# Patient Record
Sex: FEMALE | Race: WHITE | NOT HISPANIC OR LATINO | Employment: FULL TIME | URBAN - NONMETROPOLITAN AREA
[De-identification: names, ages, dates, MRNs, and addresses within clinical notes are randomized per-mention and may not be internally consistent; named-entity substitution may affect disease eponyms.]

---

## 2018-02-28 ENCOUNTER — DOCUMENTATION ONLY (OUTPATIENT)
Dept: FAMILY MEDICINE | Facility: OTHER | Age: 58
End: 2018-02-28

## 2018-12-10 ENCOUNTER — HOSPITAL ENCOUNTER (OUTPATIENT)
Dept: MAMMOGRAPHY | Facility: OTHER | Age: 58
Discharge: HOME | End: 2018-12-10
Attending: NURSE PRACTITIONER | Admitting: FAMILY MEDICINE
Payer: COMMERCIAL

## 2018-12-10 DIAGNOSIS — Z12.31 VISIT FOR SCREENING MAMMOGRAM: ICD-10-CM

## 2018-12-10 PROCEDURE — 77067 SCR MAMMO BI INCL CAD: CPT

## 2019-12-02 ENCOUNTER — OFFICE VISIT (OUTPATIENT)
Dept: PULMONOLOGY | Facility: OTHER | Age: 59
End: 2019-12-02
Attending: INTERNAL MEDICINE
Payer: COMMERCIAL

## 2019-12-02 VITALS
OXYGEN SATURATION: 98 % | SYSTOLIC BLOOD PRESSURE: 128 MMHG | HEIGHT: 65 IN | WEIGHT: 249.2 LBS | BODY MASS INDEX: 41.52 KG/M2 | DIASTOLIC BLOOD PRESSURE: 76 MMHG | TEMPERATURE: 97.9 F | HEART RATE: 60 BPM | RESPIRATION RATE: 16 BRPM

## 2019-12-02 DIAGNOSIS — G47.50 PARASOMNIA, ORGANIC: Primary | ICD-10-CM

## 2019-12-02 DIAGNOSIS — R06.83 SNORING: ICD-10-CM

## 2019-12-02 DIAGNOSIS — G47.10 HYPERSOMNIA: ICD-10-CM

## 2019-12-02 PROCEDURE — G0463 HOSPITAL OUTPT CLINIC VISIT: HCPCS

## 2019-12-02 RX ORDER — ONDANSETRON 4 MG/1
4 TABLET, FILM COATED ORAL
COMMUNITY

## 2019-12-02 RX ORDER — LEVOTHYROXINE SODIUM 112 UG/1
112 TABLET ORAL
COMMUNITY

## 2019-12-02 SDOH — HEALTH STABILITY: MENTAL HEALTH: HOW OFTEN DO YOU HAVE A DRINK CONTAINING ALCOHOL?: MONTHLY OR LESS

## 2019-12-02 ASSESSMENT — ANXIETY QUESTIONNAIRES
2. NOT BEING ABLE TO STOP OR CONTROL WORRYING: NOT AT ALL
5. BEING SO RESTLESS THAT IT IS HARD TO SIT STILL: NOT AT ALL
1. FEELING NERVOUS, ANXIOUS, OR ON EDGE: NOT AT ALL
3. WORRYING TOO MUCH ABOUT DIFFERENT THINGS: NOT AT ALL
GAD7 TOTAL SCORE: 0
7. FEELING AFRAID AS IF SOMETHING AWFUL MIGHT HAPPEN: NOT AT ALL
6. BECOMING EASILY ANNOYED OR IRRITABLE: NOT AT ALL
IF YOU CHECKED OFF ANY PROBLEMS ON THIS QUESTIONNAIRE, HOW DIFFICULT HAVE THESE PROBLEMS MADE IT FOR YOU TO DO YOUR WORK, TAKE CARE OF THINGS AT HOME, OR GET ALONG WITH OTHER PEOPLE: NOT DIFFICULT AT ALL

## 2019-12-02 ASSESSMENT — PAIN SCALES - GENERAL: PAINLEVEL: MILD PAIN (2)

## 2019-12-02 ASSESSMENT — MIFFLIN-ST. JEOR: SCORE: 1698.3

## 2019-12-02 ASSESSMENT — PATIENT HEALTH QUESTIONNAIRE - PHQ9
SUM OF ALL RESPONSES TO PHQ QUESTIONS 1-9: 14
5. POOR APPETITE OR OVEREATING: NOT AT ALL

## 2019-12-02 NOTE — PROGRESS NOTES
"Sleep Medicine  Note  Aleyda Moy  December 2, 2019  [id number]    Chief Complaint: sleep disturbances    History of Present Illness: Aleyda Moy is a 59 year old female presenting for above complaint.  She has multiple complaints related to her sleep.  One is that she snores and has apneas witnessed by her .  She awakens herself snoring and gasping.  Another is that she is quite tired.  She scores 13 on the [place] Sleepiness Scale.  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  She works 12-hour shifts from 6 pm till 6:30 am.  She works nights.  She works 3 on with 4 off followed by 4 on and 3 off.  She awakens with headaches about once a week.  She awakens with a raw throat.  She has reflux occurring at night if she eats before she goes to bed.  She also has very active sleep.  She thrashes.  She flails.  She has struck her .  She has fallen out of bed.  She describes these as nightmares.  She denies any tremor or Parkinson symptoms.  She does not drink alcohol.  She drinks energy drinks during the shift work.  She has hypothyroidism.  She is on Synthroid and Zofran.  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  She has a brother who had a CPAP unit that he was using but quit.  She has 2 sisters that snore loudly.  Her father snores loudly.  She denies any restless limb symptoms but says that her legs ache.  She is  to her  who is at home.  He is disabled.  She is a .      Medications:  Current Outpatient Medications   Medication     levothyroxine (SYNTHROID/LEVOTHROID) 112 MCG tablet     ondansetron (ZOFRAN) 4 MG tablet     No current facility-administered medications for this visit.        Physical Exam:  /76 (BP Location: Right arm, Patient Position: Sitting, Cuff Size: Adult Regular)   Pulse 60   Temp 97.9  F (36.6  C) (Tympanic)   Resp 16   Ht 5' 4.5\" (1.638 m)   Wt 249 lb 3.2 oz (113 kg)   SpO2 98%   BMI 42.11 " kg/m   Pharynx is without erythema, dentition is intact, posterior crowding is noted, malampatti class III.  Lungs are clear no wheeze, rhonchi, crackles, or focal dullness.  Cardiovascular exam S1-S2 regular rhythm and rate no murmur or rub.      Neck circumference is 39 cm.      Assessment and Plan:  59 year old female presenting for snoring with witnessed apneas.  We discussed the sinificant  probability with her body mass and neck circumference is that she has significant sleep apnea.  We discussed home sleep apnea testing versus a in lab study.  Because she has such active sleep and potentially REM behavioral disorder,  an lab study was recommended and discussed with her.  Referral be made for an in lab study.  We did discuss ordering CPAP if sleep apnea is shown to be present.  Her tiredness is likely multifactorial including sleep apnea as well as shift work.  Before assessing hypersomnia we need to evaluate for sleep apnea and RBD.    CC: [employer]  .

## 2019-12-02 NOTE — NURSING NOTE
"Chief Complaint   Patient presents with     Consult     fatigue       Initial /76 (BP Location: Right arm, Patient Position: Sitting, Cuff Size: Adult Regular)   Pulse 60   Temp 97.9  F (36.6  C) (Tympanic)   Resp 16   Ht 5' 4.5\" (1.638 m)   Wt 249 lb 3.2 oz (113 kg)   SpO2 98%   BMI 42.11 kg/m   Estimated body mass index is 42.11 kg/m  as calculated from the following:    Height as of this encounter: 5' 4.5\" (1.638 m).    Weight as of this encounter: 249 lb 3.2 oz (113 kg).  Medication Reconciliation: Completed     Berkley Medrano LPN  "

## 2019-12-03 ASSESSMENT — ANXIETY QUESTIONNAIRES: GAD7 TOTAL SCORE: 0

## 2020-08-31 ENCOUNTER — HOSPITAL ENCOUNTER (OUTPATIENT)
Dept: MAMMOGRAPHY | Facility: OTHER | Age: 60
Discharge: HOME | End: 2020-08-31
Attending: NURSE PRACTITIONER | Admitting: NURSE PRACTITIONER
Payer: COMMERCIAL

## 2020-08-31 DIAGNOSIS — Z12.31 VISIT FOR SCREENING MAMMOGRAM: ICD-10-CM

## 2020-08-31 PROCEDURE — 77067 SCR MAMMO BI INCL CAD: CPT

## 2021-09-21 ENCOUNTER — HOSPITAL ENCOUNTER (OUTPATIENT)
Dept: MAMMOGRAPHY | Facility: OTHER | Age: 61
Discharge: HOME | End: 2021-09-21
Attending: NURSE PRACTITIONER | Admitting: NURSE PRACTITIONER
Payer: COMMERCIAL

## 2021-09-21 DIAGNOSIS — Z12.31 VISIT FOR SCREENING MAMMOGRAM: ICD-10-CM

## 2021-09-21 PROCEDURE — 77063 BREAST TOMOSYNTHESIS BI: CPT

## 2022-10-24 ENCOUNTER — HOSPITAL ENCOUNTER (OUTPATIENT)
Dept: CT IMAGING | Facility: OTHER | Age: 62
Discharge: HOME | End: 2022-10-24
Attending: NURSE PRACTITIONER | Admitting: NURSE PRACTITIONER
Payer: COMMERCIAL

## 2022-10-24 DIAGNOSIS — Z13.9 ENCOUNTER FOR SCREENING, UNSPECIFIED: ICD-10-CM

## 2022-10-24 PROCEDURE — 71250 CT THORAX DX C-: CPT

## 2022-11-08 ENCOUNTER — HOSPITAL ENCOUNTER (OUTPATIENT)
Dept: MAMMOGRAPHY | Facility: OTHER | Age: 62
Discharge: HOME | End: 2022-11-08
Attending: NURSE PRACTITIONER | Admitting: NURSE PRACTITIONER
Payer: COMMERCIAL

## 2022-11-08 DIAGNOSIS — Z12.31 VISIT FOR SCREENING MAMMOGRAM: ICD-10-CM

## 2022-11-08 PROCEDURE — 77067 SCR MAMMO BI INCL CAD: CPT

## 2023-10-13 ENCOUNTER — MEDICAL CORRESPONDENCE (OUTPATIENT)
Dept: HEALTH INFORMATION MANAGEMENT | Facility: OTHER | Age: 63
End: 2023-10-13

## 2023-11-02 ENCOUNTER — OFFICE VISIT (OUTPATIENT)
Dept: OBGYN | Facility: OTHER | Age: 63
End: 2023-11-02
Attending: OBSTETRICS & GYNECOLOGY
Payer: COMMERCIAL

## 2023-11-02 VITALS
WEIGHT: 210.7 LBS | BODY MASS INDEX: 35.61 KG/M2 | HEART RATE: 62 BPM | SYSTOLIC BLOOD PRESSURE: 122 MMHG | DIASTOLIC BLOOD PRESSURE: 72 MMHG

## 2023-11-02 DIAGNOSIS — B97.7 HIGH RISK HPV INFECTION: Primary | ICD-10-CM

## 2023-11-02 PROCEDURE — 88305 TISSUE EXAM BY PATHOLOGIST: CPT

## 2023-11-02 PROCEDURE — 88342 IMHCHEM/IMCYTCHM 1ST ANTB: CPT

## 2023-11-02 PROCEDURE — 57454 BX/CURETT OF CERVIX W/SCOPE: CPT | Performed by: OBSTETRICS & GYNECOLOGY

## 2023-11-02 RX ORDER — MULTIPLE VITAMINS W/ MINERALS TAB 9MG-400MCG
1 TAB ORAL DAILY
COMMUNITY

## 2023-11-02 RX ORDER — FAMOTIDINE 20 MG
TABLET ORAL
COMMUNITY

## 2023-11-02 RX ORDER — SIMVASTATIN 20 MG
20 TABLET ORAL AT BEDTIME
COMMUNITY

## 2023-11-02 ASSESSMENT — PAIN SCALES - GENERAL: PAINLEVEL: NO PAIN (0)

## 2023-11-02 NOTE — PROGRESS NOTES
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  Previous abnormal paps: no  History of LEEP or [facility] bx:  no  No past medical history on file.  No past surgical history on file.  Current Outpatient Medications   Medication    levothyroxine (SYNTHROID/LEVOTHROID) 112 MCG tablet    multivitamin w/minerals (MULTI-VITAMIN) tablet    ondansetron (ZOFRAN) 4 MG tablet    simvastatin (ZOCOR) 20 MG tablet    Vitamin D, Cholecalciferol, 25 MCG (1000 UT) CAPS     No current facility-administered medications for this visit.     No Known Allergies    /72   Pulse 62   Wt 95.6 kg (210 lb 11.2 oz)   BMI 35.61 kg/m        General Assessment:  Cervix fully visualized: Y  SCJ fully visualized: Y    Normal Colposcopic findings:   Atrophic epithelium  Columnar epithelium no  Ectopy no  Ectropion no  Metaplastic squamous epithelium no  Nabothian cysts no  Crypt (gland) openings no  Deciduosis in pregnancy no  Submucosal branching vessels no    Abnormal Colposcopic findings:    Lesions present:  Acetowhite: yes   Location:7 O'clock  Size:1-2 mm  Low-grade features: thin, translucent aceto-white SCJ    High grade features: no  Suspicion for invasion: no    Other:     Lugol's Non-staining:Y    Miscellaneous findings: na/    Biopsy performed at  7 O'clock  ECC performed: Y     Impression:  Normal/benign to  Low Grade     F/u to be determined by pathologic verification of the impression.    Yash Cordova MD FACOG  10:53 AM 11/2/2023

## 2023-11-02 NOTE — NURSING NOTE
"Chief Complaint   Patient presents with    Procedure     colp   Patient is here for a colposcopy. [facility] with normal pap and HPV postive 16,     Initial /72   Pulse 62   Wt 95.6 kg (210 lb 11.2 oz)   BMI 35.61 kg/m   Estimated body mass index is 35.61 kg/m  as calculated from the following:    Height as of 12/2/19: 1.638 m (5' 4.5\").    Weight as of this encounter: 95.6 kg (210 lb 11.2 oz).  Medication Reconciliation: complete      Laura Camacho LPN  Prior to the start of the procedure and with procedural staff participation, I verbally confirmed the patient s identity using two indicators, relevant allergies, that the procedure was appropriate and matched the consent or emergent situation, and that the correct equipment/implants were available. Immediately prior to starting the procedure I conducted the Time Out with the procedural staff and re-confirmed the patient s name, procedure, and site/side. (The Joint Commission universal protocol was followed.)  Yes    Sedation (Moderate or Deep): None   "

## 2023-11-07 LAB
PATH REPORT.COMMENTS IMP SPEC: NORMAL
PATH REPORT.FINAL DX SPEC: NORMAL
PHOTO IMAGE: NORMAL

## 2023-11-09 ENCOUNTER — HOSPITAL ENCOUNTER (OUTPATIENT)
Dept: MAMMOGRAPHY | Facility: OTHER | Age: 63
Discharge: HOME | End: 2023-11-09
Attending: NURSE PRACTITIONER | Admitting: NURSE PRACTITIONER
Payer: COMMERCIAL

## 2023-11-09 DIAGNOSIS — Z12.31 ENCOUNTER FOR SCREENING MAMMOGRAM FOR MALIGNANT NEOPLASM OF BREAST: ICD-10-CM

## 2023-11-09 PROCEDURE — 77067 SCR MAMMO BI INCL CAD: CPT

## 2024-10-04 ENCOUNTER — MEDICAL CORRESPONDENCE (OUTPATIENT)
Dept: HEALTH INFORMATION MANAGEMENT | Facility: OTHER | Age: 64
End: 2024-10-04

## 2024-12-05 ENCOUNTER — OFFICE VISIT (OUTPATIENT)
Dept: OBGYN | Facility: OTHER | Age: 64
End: 2024-12-05
Attending: OBSTETRICS & GYNECOLOGY
Payer: COMMERCIAL

## 2024-12-05 VITALS — DIASTOLIC BLOOD PRESSURE: 76 MMHG | SYSTOLIC BLOOD PRESSURE: 134 MMHG | HEART RATE: 67 BPM

## 2024-12-05 DIAGNOSIS — B97.7 HIGH RISK HPV INFECTION: Primary | ICD-10-CM

## 2024-12-05 ASSESSMENT — PAIN SCALES - GENERAL: PAINLEVEL_OUTOF10: EXTREME PAIN (8)

## 2024-12-05 NOTE — PROGRESS NOTES
Colposcopy Procedure Note     LMP:  No LMP recorded.      History: Medical, surgical, family, and social history, as well as medications and allergies, were all verified and updated.     Risk Factors:   Tobacco: Yes, smoking daily. Not ready to quit  Immunodepressive medications: no     Indication for colposcopy:   This is a 64 year old with pap smear showing: + HPV. [facility]'t [facility], not sure what they said about the appearance of the cells.      Her prior exams include:   - Paps prior to 2023 were WNL  - 2023 pap: NILM, + HPV 16  - 11/2/23 colp: biopsy at 7:00 returned showing inflammation but no dysplasia   - September 2024 pap: told her HPV was positive again and needed a colposcopy     Prior cervical treatment includes: colposcopy x 1     Consent:  The procedure risks, benefits and side effects were explained to the patient. Questions were answered and an informed consent was obtained.        Procedure Details:  The patient was placed in the dorsal lithotomy position. Speculum was placed and the cervix was visualized. Acetic acid was applied to the cervix. Findings of acetowhite changes were absent. Lugol's was applied to the cervix and confirmed these findings.       Squamarcolumnar junction seen: yes  Transformation zone seen: yes  Canal clear: yes  Atypical vessels: no  Endocervical curettage performed:no  Cervical biopsies not taken     Patient tolerated the procedure well and post procedure pain was rated as minimal     Specimens to Pathology:   None     Impression:   Colposcopy was Adequate  Colpscopic impression: normal cervix     Plan:  Plan was reviewed with the patient including and handout given:  - Plan repeat pap smear in 1 year  - Recommended tobacco cessation. Discussed different quitting strategies. She will reach out if she would like to pursue this in the future     Questions are answered and the patient states good understanding of instructions.      Lorraine Schofield MD